# Patient Record
Sex: FEMALE | Race: WHITE | HISPANIC OR LATINO | Employment: FULL TIME | URBAN - NONMETROPOLITAN AREA
[De-identification: names, ages, dates, MRNs, and addresses within clinical notes are randomized per-mention and may not be internally consistent; named-entity substitution may affect disease eponyms.]

---
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
## 2024-03-21 ENCOUNTER — TELEPHONE (OUTPATIENT)
Dept: OBSTETRICS AND GYNECOLOGY | Facility: CLINIC | Age: 24
End: 2024-03-21

## 2024-03-21 NOTE — TELEPHONE ENCOUNTER
Referral received from pcp, called pt to get her schedule, pt states she is receiving care elsewhere.

## 2024-07-16 DIAGNOSIS — O35.EXX0 ENCOUNTER FOR REPEAT ULTRASOUND OF FETAL PYELECTASIS IN SINGLETON PREGNANCY, ANTEPARTUM: Primary | ICD-10-CM

## 2024-07-30 ENCOUNTER — TELEPHONE (OUTPATIENT)
Dept: MATERNAL FETAL MEDICINE | Facility: CLINIC | Age: 24
End: 2024-07-30
Payer: MEDICAID

## 2024-07-30 PROBLEM — O28.3 ABNORMAL PRENATAL ULTRASOUND: Status: ACTIVE | Noted: 2024-07-30

## 2024-07-30 PROBLEM — O98.012: Status: ACTIVE | Noted: 2024-07-30

## 2024-07-31 ENCOUNTER — OFFICE VISIT (OUTPATIENT)
Dept: MATERNAL FETAL MEDICINE | Facility: CLINIC | Age: 24
End: 2024-07-31
Payer: MEDICAID

## 2024-07-31 ENCOUNTER — TELEPHONE (OUTPATIENT)
Dept: MATERNAL FETAL MEDICINE | Facility: CLINIC | Age: 24
End: 2024-07-31

## 2024-07-31 ENCOUNTER — PROCEDURE VISIT (OUTPATIENT)
Dept: MATERNAL FETAL MEDICINE | Facility: CLINIC | Age: 24
End: 2024-07-31
Payer: MEDICAID

## 2024-07-31 VITALS
DIASTOLIC BLOOD PRESSURE: 76 MMHG | HEIGHT: 63 IN | WEIGHT: 201 LBS | SYSTOLIC BLOOD PRESSURE: 124 MMHG | BODY MASS INDEX: 35.61 KG/M2 | HEART RATE: 108 BPM

## 2024-07-31 DIAGNOSIS — O35.EXX0 ENCOUNTER FOR REPEAT ULTRASOUND OF FETAL PYELECTASIS, ANTEPARTUM, SINGLE OR UNSPECIFIED FETUS: ICD-10-CM

## 2024-07-31 DIAGNOSIS — O09.90 AT HIGH RISK FOR COMPLICATIONS OF INTRAUTERINE PREGNANCY (IUP): ICD-10-CM

## 2024-07-31 DIAGNOSIS — O28.3 ABNORMAL PRENATAL ULTRASOUND: Primary | ICD-10-CM

## 2024-07-31 DIAGNOSIS — O99.212 SEVERE OBESITY DUE TO EXCESS CALORIES AFFECTING PREGNANCY IN SECOND TRIMESTER: ICD-10-CM

## 2024-07-31 DIAGNOSIS — O98.012: ICD-10-CM

## 2024-07-31 DIAGNOSIS — O35.EXX0 ENCOUNTER FOR REPEAT ULTRASOUND OF FETAL PYELECTASIS IN SINGLETON PREGNANCY, ANTEPARTUM: ICD-10-CM

## 2024-07-31 DIAGNOSIS — E66.01 SEVERE OBESITY DUE TO EXCESS CALORIES AFFECTING PREGNANCY IN SECOND TRIMESTER: ICD-10-CM

## 2024-07-31 RX ORDER — ASPIRIN 81 MG/1
81 TABLET ORAL DAILY
Qty: 30 TABLET | Refills: 3 | Status: SHIPPED | OUTPATIENT
Start: 2024-07-31 | End: 2024-11-28

## 2024-07-31 NOTE — TELEPHONE ENCOUNTER
----- Message from TAY Cesar sent at 7/30/2024  7:45 PM CDT -----  Regarding: labs  Please call Michael in the am to see if patient had cfDNA or quad screen- need any genetic testing for baby that they have done- Thanks    Called primary they have a NIPS that was done and she is faxing it to us

## 2024-07-31 NOTE — PROGRESS NOTES
Maternal Fetal Medicine New Consult    Subjective:     Patient ID: [id number]    Chief Complaint: MFM Consult w/us  (For fetal pyelectasis)      HPI: 24 y.o.  female  at 25w5d gestation with Estimated Date of Delivery: 24 by LMP, consistent with early US. She is sent for MFM consultation for fetal pyelectasis.     Patient had suspected fetal pyelectasis noted on outside scan.  She had negative cell free DNA.  She also has tuberculosis. Per patient report, appears that it is latent, she reports that she is following with [facility] who has told her that she is not contagious and does not need treatment but they will follow up with her in the postpartum period.  Patient was accompanied by her significant other.  Visit was done with the help of [employer] 989829.       She denies any personal or family history of aneuploidy or anomalies. She denies any exposure to high fevers, viral rashes, illicit drugs or alcohol in this pregnancy.  She denies any leaking fluid, vaginal bleeding, contractions, decreased fetal movement. Denies headaches, visual disturbances, or epigastric pain.       Pregnancy complications include:  Patient Active Problem List   Diagnosis    Malrotation, congenital - possible    Abnormal prenatal ultrasound    Tuberculosis affecting pregnancy in second trimester    At high risk for complications of intrauterine pregnancy (IUP)    Increased BMI at 35 affecting pregnancy in second trimester    Ultrasound recheck of fetal pyelectasis, antepartum       History reviewed. No pertinent past medical history.    History reviewed. No pertinent surgical history.    Family History   Problem Relation Name Age of Onset    Hypertension Mother      Diabetes Mother         Social History     Socioeconomic History    Marital status: Single   Tobacco Use    Smoking status: Never     Passive exposure: Past    Smokeless tobacco: Never   Substance and Sexual Activity    Alcohol use: Not  "Currently    Drug use: Never    Sexual activity: Not Currently     Partners: Male       Current Outpatient Medications   Medication Sig Dispense Refill    PNV no.153/FA/om3/dha/epa/fish (PRENATAL GUMMIES ORAL) Take by mouth.      aspirin (ECOTRIN) 81 MG EC tablet Take 1 tablet (81 mg total) by mouth once daily. 30 tablet 3     No current facility-administered medications for this visit.       Review of patient's allergies indicates:  No Known Allergies     Review of Systems   12 point review of systems conducted, negative except as stated in the history of present illness. See HPI for details.      Objective:     Visit Vitals  /76 (BP Location: Left arm, Patient Position: Sitting, BP Method: Medium (Automatic))   Pulse 108   Ht 5' 3" (1.6 m)   Wt 91.2 kg (201 lb)   LMP  (LMP Unknown)   BMI 35.61 kg/m²        Physical Exam  Vitals and nursing note reviewed.   Constitutional:       General: She is not in acute distress.     Appearance: Normal appearance.      Comments: Increased BMI   HENT:      Head: Normocephalic and atraumatic.      Nose: Nose normal. No congestion.      Mouth/Throat:      Pharynx: Oropharynx is clear.   Eyes:      General: No scleral icterus.     Conjunctiva/sclera: Conjunctivae normal.   Cardiovascular:      Rate and Rhythm: Normal rate and regular rhythm.   Pulmonary:      Effort: No respiratory distress.      Breath sounds: Normal breath sounds. No wheezing.   Abdominal:      General: Abdomen is flat.      Palpations: Abdomen is soft.      Tenderness: There is no abdominal tenderness. There is no right CVA tenderness, left CVA tenderness or guarding.      Comments: No CVA tenderness gravid uterus.    Musculoskeletal:         General: Normal range of motion.      Cervical back: Neck supple.      Right lower leg: No edema.      Left lower leg: No edema.   Skin:     General: Skin is warm.      Findings: No bruising or rash.   Neurological:      General: No focal deficit present.      Mental " Status: She is oriented to person, place, and time.      Deep Tendon Reflexes: Reflexes normal.      Comments: Normal reflexes   Psychiatric:         Mood and Affect: Mood normal.         Behavior: Behavior normal.         Thought Content: Thought content normal.         Judgment: Judgment normal.         Assessment/Plan:     24 y.o.  female with IUP at 25w5d    Unilateral fetal Pyelectasis  There is normal fetal growth with an EFW of 912 g at the 56% and the AC at the 57% on 24  AFV is normal.     AP diameter of R renal pelvis measures 5.7mm on 24.     She was advised that with mild fetal pyelectasis there is increased risk of aneuploidy, two fold above what is expected based on maternal age and or quad screen risk.    Pyelectasis may be transient or physiologic in 50-70% or due to ureteropelvic junction obstruction in 10-30%, vesicoureteral reflux in 10-40%, or lower urinary tract obstruction (LUTO) in 10-15%. It has been described as a weak marker for aneuploidy, particularly with a likelihood ratio of 1.5-2.0 above what is expected based on maternal age and/or quad screen risk. The risks and benefits of genetic amniocentesis were explained to her. She declined amniocentesis . She was advised of the need for  evaluation.    I discussed and counseled on Cell free DNA understanding that it is an enhanced screening test but not a diagnostic test. It assesses risk for common aneuploidies such as Trisomy 13, 18, and 21 by evaluating cell-free fetal DNA in maternal circulation with a false positive rate less than 0.5% for Trisomy 21 and less reliable for Trisomy 13 and Trisomy 18. She had cell free DNA that was negative .    Because mild pyelectasis may be a precursor of more significant hydronephrosis, we will plan to recheck fetal growth around 32 weeks, unless needed earlier ultrasounds for other reasons. If progressive hydronephrosis is noted, then continued follow-up antenatally and with   follow-up as needed.     If persistent pyelectasis, recommend   ultrasound 1 week after birth with pediatrician/pediatric urologist. If persistent pyelectasis, baby will need referral to pediatric urologist.    I discussed with her that antenatally as long as there is normal amniotic fluid volume then expectant management will be continued through the pregnancy. Fetal kick count instructions were given. She was advised to report any decreased fetal movement.      Tuberculosis  Per Patient report, it appears that patient has latent TB.     Risk factors for TB include - HIV positive status, living or working in high risk setting, living or emigrating from high risk country, exposure, low socioeconomic status and IV drug use.     Latent TB: This is diagnosed via +mantoux/blood test, negative CXR and sputum. 5-10% of patient's with latent tuberculosis (TB) will progress to active disease. Latent TB is NOT contagious, with the only risk is that it can progress to active. Latent TB can wait to be treated until 2-3 months postpartum if risk is low of progression to active disease. Some risk factors for progression include: HIV/immunocompromised, IV drug use, infection in the last 2 years. For women who are at high risk for progression from latent TB infection to TB disease, especially those who are a recent contact of someone with infectious TB disease, treatment for latent TB infection should not be delayed on the basis of pregnancy alone, even during the first trimester.    Active TB: This is diagnosed via +mantoux/blood, positive CXR and/or sputum cultures. Mantoux may remain positive 2-12 weeks after exposure. Blood test is preferred if someone has had BCG vaccine. Active TB in pregnancy increases risk of pregnancy loss, stillbirth,  delivery, and lower birth weight. There is also risk of congenital TB, which can cause sepsis, hepatic infiltation, or pneumonia. If this is suspected, we  will plan to culture the placenta. Active TB should be treated for a minimum of 9 months with INH, rifampin, ethambutol (see below). Pyrazinamide should be added if extrapulmonary, severe active, or HIV coinfection.     If +BCG vaccine >10 years ago and +PPD >10mm, patient can be considered infected with TB    Breastfeeding should not be discouraged for women being treated with the first-line antituberculosis drugs because the concentrations of these drugs in breast milk are too small to produce toxicity in the nursing . For the same reason, drugs in breast milk are not an effective treatment for TB disease or latent TB infection in a nursing infant.  Breastfeeding women taking INH should also take pyridoxine (vitamin B6) supplementation.  RIF can cause orange discoloration of body fluids, including breast milk. Orange discoloration of body fluids is expected and harmless. There currently is not enough data to indicate whether the 3HP regimen is safe for women to take while breastfeeding.  However, untreated active TB is a contraindication to breastfeeding      Recommendations:  Keep followup with [facility] Department as directed.  Growth ultrasound is recommended in the third trimester  Otherwise, routine prenatal care.  No specific recommendations for prenatal testing with no evidence of active disease.  Mode and timing of delivery per routine OB indications.  Breastfeeding recommended per patient desire, unless active and untreated. (2 weeks of treatment and a negative sputum culture may allow to breastfeed)  Primary OB to alert pediatrics regarding patient's diagnosis.      Preeclampsia prophylaxis  With her risk factors for preeclampsia including  nulliparity, BMI over 30, and low socioeconomic status, discussed recommendations for low dose aspirin use to decrease risks for adverse outcomes, including preeclampsia, low birth weight and  delivery. Low-dose aspirin reduced the risk for preeclampsia by  15% in clinical trials and reduced the risk for  birth by 20% and FGR by 20%, and  mortality by around 20%.  After discussing risks/benefits of its use, it was agreed to start asa 81 mg daily today and continue until delivery.. Also, recommend using in all future pregnancies.  Preeclampsia precautions given.    Patient was counseled on fetal pyelectasis association with aneuploidy.  With a negative cell free DNA advised her that no additional testing is recommended.  However she was offered and politely declined to have an amniocentesis.  Diet advice with proper weight gain recommendations reviewed.  Patient was started on daily aspirin to lower the risk of preeclampsia.  Advised her to follow with health unit as she might need treatment after delivery for her latent tuberculosis. Patient's questions were answered.  She is in agreement with plan of care.      Follow up in about 6 weeks (around 2024) for [facility] Followup, Repeat Ultrasound.     Future Appointments   Date Time Provider Department Center   2024  3:00 PM Brenotn Gonzalez MD [facility]   2024  [facility]        Patient was evaluated by TAY Cesar, and and Dr. Gonzalez.  Final assessment and recommendations as stated above were made by Dr. Gonzalez.    This note was created with the assistance of [employer] voice recognition software. There may be transcription errors as a result of using this technology, however minimal. Effort has been made to ensure accuracy of transcription, but any obvious errors or omissions should be clarified with the author of the document.

## 2024-09-05 DIAGNOSIS — O35.EXX0 ENCOUNTER FOR REPEAT ULTRASOUND OF FETAL PYELECTASIS IN SINGLETON PREGNANCY, ANTEPARTUM: Primary | ICD-10-CM

## 2024-09-05 DIAGNOSIS — O98.012: ICD-10-CM

## 2024-09-20 PROBLEM — Q43.3 MALROTATION, CONGENITAL: Status: RESOLVED | Noted: 2017-03-29 | Resolved: 2024-09-20

## 2024-09-20 PROBLEM — O98.013: Status: ACTIVE | Noted: 2024-07-30

## 2024-09-20 PROBLEM — O99.213 SEVERE OBESITY DUE TO EXCESS CALORIES AFFECTING PREGNANCY IN THIRD TRIMESTER: Status: ACTIVE | Noted: 2024-07-31

## 2024-09-20 NOTE — PROGRESS NOTES
Maternal Fetal Medicine Follow Up    Subjective:     Patient ID: [id number]    Chief Complaint: MFM follow up w/us  (Pt did not bring bs log for review however reports fasting's up to 95, 1 hr pp up to 120)      HPI: Lidia Baez is a 24 y.o. female  at 33w3d gestation with Estimated Date of Delivery: 24  who is here for follow-up consultation by MFM.    Patient had fetal pyelectasis noted on previous US.  She had negative cell free DNA and declined amniocentesis.  She also has tuberculosis. Per patient report, appears that it is latent, she reports that she is following with the [facility] who has told her that she is not contagious and does not need treatment but they will follow up with her in the postpartum period.  She had increased BMI over 35 at initial MFM consult.  She is on low-dose aspirin daily.  Patient reports was recently diagnosed with GDM and is following diabetic diet and monitoring her sugars.  She did not bring a log for review today but reports fasting values up to 95 (mostly below 90) and 2 hour postprandial up to 120.  She reports that her postprandial values are mostly in the normal range but has a occasional elevation of 120 a couple times a week.    Today's visit was done with the help of [employer] translation services,  ID number [id number].        Interval history since last MFM visit: None.. She denies any leaking fluid, vaginal bleeding, contractions, decreased fetal movement. Denies headaches, visual disturbances, or epigastric pain.    Pregnancy complications include:   Patient Active Problem List   Diagnosis    Abnormal prenatal ultrasound    Tuberculosis affecting pregnancy in third trimester    At high risk for complications of intrauterine pregnancy (IUP)    Increased BMI over 35 affecting pregnancy in third trimester    Ultrasound recheck of fetal pyelectasis, antepartum    Gestational diabetes mellitus (GDM) in third trimester       No changes  "to medical, surgical, family, social, or obstetric history.    Medications:  Current Outpatient Medications   Medication Instructions    ALCOHOL PREP PAD SPACER SMARTSI Swab Topical 5 Times Daily PRN    aspirin (ECOTRIN) 81 mg, Oral, Daily    PNV no.153/FA/om3/dha/epa/fish (PRENATAL GUMMIES ORAL) Oral    TRUE METRIX AIR GLUCOSE METER Misc USE TO CHECK GLUCOSE FIVE TIMES DAILY AND AS NEEDED    TRUE METRIX GLUCOSE TEST STRIP Strp SMARTSI Strip(s) Via Meter 5 Times Daily PRN       Review of Systems   12 point review of systems conducted, negative except as stated in the history of present illness. See HPI for details.      Objective:     Visit Vitals  /85 (BP Location: Left arm, Patient Position: Sitting, BP Method: Medium (Automatic))   Pulse (!) 119   Ht 5' 3" (1.6 m)   Wt 93.4 kg (206 lb)   LMP  (LMP Unknown)   BMI 36.49 kg/m²        Physical Exam  Vitals and nursing note reviewed.   Constitutional:       Appearance: Normal appearance.      Comments: Increased BMI   HENT:      Nose: Nose normal.   Cardiovascular:      Rate and Rhythm: Normal rate.   Pulmonary:      Effort: Pulmonary effort is normal. No respiratory distress.   Musculoskeletal:         General: Normal range of motion.   Neurological:      General: No focal deficit present.      Mental Status: She is alert and oriented to person, place, and time.   Psychiatric:         Mood and Affect: Mood normal.         Behavior: Behavior normal.         Thought Content: Thought content normal.         Judgment: Judgment normal.         Assessment/Plan:     24 y.o.  female with IUP at 33w3d    Right fetal pyelectasis  There is normal fetal growth with an EFW of 2250 g at the 30% and the AC at the 39% on 24.  AFV is normal. BPP is 8/8 today (2024).     AP diameter of R renal pelvis measures 7.6 mm on 24.     She had cell free DNA that was negative . She declined amniocentesis . She is aware of the need for  " evaluation.    Recommend   ultrasound 1 week after birth with pediatrician/pediatric urologist. If persistent pyelectasis, baby will need referral to pediatric urologist.    I reviewed with her that antenatally as long as there is normal amniotic fluid volume then expectant management will be continued through the pregnancy. Fetal kick count instructions were reviewed. She was advised to report any decreased fetal movement.       History of tuberculosis  Per Patient and Health Department, it appears that patient has latent TB.     Latent TB: This is diagnosed via +mantoux/blood test, negative CXR and sputum. 5-10% of patient's with latent tuberculosis (TB) will progress to active disease. Latent TB is NOT contagious, with the only risk is that it can progress to active. Latent TB can wait to be treated until 2-3 months postpartum if risk is low of progression to active disease. Some risk factors for progression include: HIV/immunocompromised, IV drug use, infection in the last 2 years. For women who are at high risk for progression from latent TB infection to TB disease, especially those who are a recent contact of someone with infectious TB disease, treatment for latent TB infection should not be delayed on the basis of pregnancy alone, even during the first trimester.    Breastfeeding should not be discouraged for women being treated with the first-line antituberculosis drugs because the concentrations of these drugs in breast milk are too small to produce toxicity in the nursing . For the same reason, drugs in breast milk are not an effective treatment for TB disease or latent TB infection in a nursing infant.    Breastfeeding women taking INH should also take pyridoxine (vitamin B6) supplementation.  RIF can cause orange discoloration of body fluids, including breast milk. Orange discoloration of body fluids is expected and harmless. There currently is not enough data to indicate whether  the 3HP regimen is safe for women to take while breastfeeding. However, untreated active TB is a contraindication to breastfeeding    Recommendations:  Keep followup with Health Department as directed.  With normal growth, recommend routine prenatal care.  No specific recommendations for prenatal testing with no evidence of active disease.  Mode and timing of delivery per routine OB indications.  Breastfeeding recommended per patient desire, unless active and untreated. (2 weeks of treatment and a negative sputum culture may allow to breastfeed)  Primary OB to alert pediatrics regarding patient's diagnosis.      Gestational diabetes  The incidence of diabetes in pregnancy is 6-7%, and about 85% represent GDM. I discussed with her risks associated with diabetes in pregnancy including higher risk for polyhydramnios, fetal macrosomia, lower Apgar scores and  metabolic complications (hypoglycemia, hyperbilirubinemia, hypocalcemia, erythema) and developing preeclampsia    Currently, she was advised that she needs to be on 2200 calorie diabetic diet.It is recommended that she have 30-45 grams of carbohydrates with breakfast, a mid-morning snack with 15-30 grams of carbohydrates, 45-60 grams of carbohydrates with lunch, a mid-afternoon snack with 15-30 grams of carbohydrates, 45-60 grams of carbohydrates with dinner, and a bedtime snack with 15-30 grams of carbohydrates. The importance of avoiding any significant weight gain till the end of the pregnancy was discussed.  She will be monitoring her sugars at home.  If the 1 hour postprandial greater than 140 and pre-prandial blood sugar greater than 90, then she will let me know.      I have shared with her the options of treatment including insulin treatment which is the gold standard of care for diabetes in pregnancy versus a recent use of alternatives, such as glyburide or metformin (both crosses the placenta). Although, glyburide has been used over the past 10  years as primary alternative to insulin, a recent meta-analysis (2015) suggested that metformin should be the alternative to insulin and not glyburide. The conclusion of the study showed a higher birth weight (100 g) associated with glyburide compared to insulin, two fold higher risk of  hypoglycemia, and more than 2x higher risk of macrosomia associated with glyburide use. This difference is likely to be secondary to hyperinsulinism in fetus secondary to fetal exposure to glyburide.  Metformin, also had lower maternal weight gain, lower birth rate and less risk of macrosomia when compared with glyburide. When compared to insulin, Metformin had lower maternal weight gain, increase in  birth and lower gestational age at delivery and lower incidence of gestational hypertension. There was a trend for less  hypoglycemia and high failure rate of 30-35%, when compared to insulin.  In addition, the lack of long term data on glyburide and metformin use regarding safety was addressed and recommended use of Insulin for treatment, which is the gold standard, with excellent efficiency and safety, albeit more inconvenience.  Questions were answered.     Reported values discussed. With mostly normal values, no treatment is needed at this time.  With patient reporting a few elevations but no consistent pattern, we will plan to see her back in 2 weeks to review the blood sugars and determine need for treatment.      She was advised to check glucose 4 times a day and send log/call with values weekly, and bring log to each visit.    If she does not need insulin, then regular pregnancy management could be done with consideration for fetal testing after 38 weeks gestation with awaiting normal spontaneous onset of labor and management of the pregnancy. If Insulin is needed, then closer fetal surveillance will be needed. She was advised to continue fetal kick counts 3 times daily and PRN, with immediate reporting  of decreased fetal movements (<10 movements/hr).    The association of gestational diabetes (50%) with adult-onset type 2 diabetes mellitus was reviewed.  She was advised of importance of losing weight after the pregnancy, as well as doing a 75g 2hr glucose tolerance test after postpartum exam, and checkups every 1-3 years for blood sugars to make sure she does not develop diabetes.        Increased BMI in pregnancy  Body mass index is 36.49 kg/m². With reasonable weight gain since last visit, she was advised to continue a healthy low caloric diet and diabetic diet. Excess weight gain would be associated with gestational hypertension, gestational diabetes and adverse  outcomes, including fetal demise in utero.    It is important to lose weight after the pregnancy is over, especially before a future pregnancy was discussed. Breastfeeding may be an important tool in reducing the postpartum weight retention. Fetal risks were discussed with short term risk of fetal/ obesity and long term risk of adolescent component of metabolic syndrome.    With elevated BMI, start fetal testing starting around 37 weeks. She was advised to continue fetal kick counts 3 times daily and PRN, with immediate reporting of decreased fetal movements (<10 movements/hr).       Preeclampsia prophylaxis  With her increased risk for preeclampsia, she agreed to continue asa 81 mg daily until delivery. Preeclampsia precautions reviewed.     Follow up in about 2 weeks (around 10/7/2024) for MFM follow-up; 4 weeks MFM f/u repeat bpp.     Future Appointments   Date Time Provider Department Center   10/7/2024  2:45 PM Brenton Gonzalez MD [facility]   10/21/2024  3:00 PM Brenton Gonzalez MD [facility]   10/21/2024  3:00 PM ROOM 1, [facility]      Amanda Graves PA-C     This note was created with the assistance of [employer] voice recognition software. There may be transcription errors as a result of  using this technology, however minimal. Effort has been made to ensure accuracy of transcription, but any obvious errors or omissions should be clarified with the author of the document.

## 2024-09-23 ENCOUNTER — OFFICE VISIT (OUTPATIENT)
Dept: MATERNAL FETAL MEDICINE | Facility: CLINIC | Age: 24
End: 2024-09-23
Payer: MEDICAID

## 2024-09-23 ENCOUNTER — PROCEDURE VISIT (OUTPATIENT)
Dept: MATERNAL FETAL MEDICINE | Facility: CLINIC | Age: 24
End: 2024-09-23
Payer: MEDICAID

## 2024-09-23 VITALS
DIASTOLIC BLOOD PRESSURE: 85 MMHG | BODY MASS INDEX: 36.5 KG/M2 | HEIGHT: 63 IN | HEART RATE: 119 BPM | SYSTOLIC BLOOD PRESSURE: 121 MMHG | WEIGHT: 206 LBS

## 2024-09-23 DIAGNOSIS — O99.213 SEVERE OBESITY DUE TO EXCESS CALORIES AFFECTING PREGNANCY IN THIRD TRIMESTER: ICD-10-CM

## 2024-09-23 DIAGNOSIS — E66.01 SEVERE OBESITY DUE TO EXCESS CALORIES AFFECTING PREGNANCY IN THIRD TRIMESTER: ICD-10-CM

## 2024-09-23 DIAGNOSIS — O35.EXX0 ENCOUNTER FOR REPEAT ULTRASOUND OF FETAL PYELECTASIS, ANTEPARTUM, SINGLE OR UNSPECIFIED FETUS: ICD-10-CM

## 2024-09-23 DIAGNOSIS — O98.012: ICD-10-CM

## 2024-09-23 DIAGNOSIS — O35.EXX0 ENCOUNTER FOR REPEAT ULTRASOUND OF FETAL PYELECTASIS IN SINGLETON PREGNANCY, ANTEPARTUM: ICD-10-CM

## 2024-09-23 DIAGNOSIS — O24.419 GESTATIONAL DIABETES MELLITUS (GDM) IN THIRD TRIMESTER, GESTATIONAL DIABETES METHOD OF CONTROL UNSPECIFIED: ICD-10-CM

## 2024-09-23 DIAGNOSIS — O28.3 ABNORMAL PRENATAL ULTRASOUND: ICD-10-CM

## 2024-09-23 DIAGNOSIS — O98.013 TUBERCULOSIS AFFECTING PREGNANCY IN THIRD TRIMESTER: Primary | ICD-10-CM

## 2024-09-23 LAB — GLUCOSE SERPL-MCNC: 124 MG/DL (ref 70–110)

## 2024-09-23 PROCEDURE — 3079F DIAST BP 80-89 MM HG: CPT | Mod: CPTII,S$GLB,,

## 2024-09-23 PROCEDURE — 76816 OB US FOLLOW-UP PER FETUS: CPT | Mod: S$GLB,,, | Performed by: OBSTETRICS & GYNECOLOGY

## 2024-09-23 PROCEDURE — 3008F BODY MASS INDEX DOCD: CPT | Mod: CPTII,S$GLB,,

## 2024-09-23 PROCEDURE — 3074F SYST BP LT 130 MM HG: CPT | Mod: CPTII,S$GLB,,

## 2024-09-23 PROCEDURE — 1159F MED LIST DOCD IN RCRD: CPT | Mod: CPTII,S$GLB,,

## 2024-09-23 PROCEDURE — 99214 OFFICE O/P EST MOD 30 MIN: CPT | Mod: TH,S$GLB,,

## 2024-09-23 PROCEDURE — 82962 GLUCOSE BLOOD TEST: CPT | Mod: ,,,

## 2024-09-23 PROCEDURE — 1160F RVW MEDS BY RX/DR IN RCRD: CPT | Mod: CPTII,S$GLB,,

## 2024-09-23 RX ORDER — CALCIUM CITRATE/VITAMIN D3 200MG-6.25
TABLET ORAL
COMMUNITY
Start: 2024-08-21

## 2024-09-23 RX ORDER — ISOPROPYL ALCOHOL 0.75 G/1
SWAB TOPICAL
COMMUNITY
Start: 2024-08-21

## 2024-09-23 RX ORDER — DIPHENHYDRAMINE HCL 25 MG
TABLET ORAL
COMMUNITY
Start: 2024-08-21

## 2024-10-07 ENCOUNTER — OFFICE VISIT (OUTPATIENT)
Dept: MATERNAL FETAL MEDICINE | Facility: CLINIC | Age: 24
End: 2024-10-07
Payer: MEDICAID

## 2024-10-07 VITALS
HEART RATE: 123 BPM | BODY MASS INDEX: 36.86 KG/M2 | HEIGHT: 63 IN | DIASTOLIC BLOOD PRESSURE: 87 MMHG | WEIGHT: 208 LBS | SYSTOLIC BLOOD PRESSURE: 135 MMHG

## 2024-10-07 DIAGNOSIS — O35.EXX0 ENCOUNTER FOR REPEAT ULTRASOUND OF FETAL PYELECTASIS, ANTEPARTUM, SINGLE OR UNSPECIFIED FETUS: ICD-10-CM

## 2024-10-07 DIAGNOSIS — E66.01 SEVERE OBESITY DUE TO EXCESS CALORIES AFFECTING PREGNANCY IN THIRD TRIMESTER: ICD-10-CM

## 2024-10-07 DIAGNOSIS — O09.90 AT HIGH RISK FOR COMPLICATIONS OF INTRAUTERINE PREGNANCY (IUP): Primary | ICD-10-CM

## 2024-10-07 DIAGNOSIS — O99.213 SEVERE OBESITY DUE TO EXCESS CALORIES AFFECTING PREGNANCY IN THIRD TRIMESTER: ICD-10-CM

## 2024-10-07 DIAGNOSIS — O24.415 GESTATIONAL DIABETES MELLITUS (GDM) IN THIRD TRIMESTER CONTROLLED ON ORAL HYPOGLYCEMIC DRUG: ICD-10-CM

## 2024-10-07 DIAGNOSIS — O24.410 DIET CONTROLLED GESTATIONAL DIABETES MELLITUS (GDM) IN THIRD TRIMESTER: ICD-10-CM

## 2024-10-07 DIAGNOSIS — O98.013 TUBERCULOSIS AFFECTING PREGNANCY IN THIRD TRIMESTER: ICD-10-CM

## 2024-10-07 LAB — GLUCOSE SERPL-MCNC: 110 MG/DL (ref 70–110)

## 2024-10-07 PROCEDURE — 3079F DIAST BP 80-89 MM HG: CPT | Mod: CPTII,S$GLB,, | Performed by: OBSTETRICS & GYNECOLOGY

## 2024-10-07 PROCEDURE — 99214 OFFICE O/P EST MOD 30 MIN: CPT | Mod: TH,S$GLB,, | Performed by: OBSTETRICS & GYNECOLOGY

## 2024-10-07 PROCEDURE — 3008F BODY MASS INDEX DOCD: CPT | Mod: CPTII,S$GLB,, | Performed by: OBSTETRICS & GYNECOLOGY

## 2024-10-07 PROCEDURE — 3075F SYST BP GE 130 - 139MM HG: CPT | Mod: CPTII,S$GLB,, | Performed by: OBSTETRICS & GYNECOLOGY

## 2024-10-07 PROCEDURE — 1160F RVW MEDS BY RX/DR IN RCRD: CPT | Mod: CPTII,S$GLB,, | Performed by: OBSTETRICS & GYNECOLOGY

## 2024-10-07 PROCEDURE — 82962 GLUCOSE BLOOD TEST: CPT | Mod: ,,, | Performed by: OBSTETRICS & GYNECOLOGY

## 2024-10-07 PROCEDURE — 1159F MED LIST DOCD IN RCRD: CPT | Mod: CPTII,S$GLB,, | Performed by: OBSTETRICS & GYNECOLOGY

## 2024-10-07 RX ORDER — METFORMIN HYDROCHLORIDE 500 MG/1
500 TABLET ORAL 2 TIMES DAILY WITH MEALS
Qty: 60 TABLET | Refills: 0 | Status: SHIPPED | OUTPATIENT
Start: 2024-10-07 | End: 2024-11-06

## 2024-10-15 DIAGNOSIS — E66.01 SEVERE OBESITY DUE TO EXCESS CALORIES AFFECTING PREGNANCY IN THIRD TRIMESTER: ICD-10-CM

## 2024-10-15 DIAGNOSIS — O24.410 DIET CONTROLLED GESTATIONAL DIABETES MELLITUS (GDM) IN THIRD TRIMESTER: ICD-10-CM

## 2024-10-15 DIAGNOSIS — O99.213 SEVERE OBESITY DUE TO EXCESS CALORIES AFFECTING PREGNANCY IN THIRD TRIMESTER: ICD-10-CM

## 2024-10-15 DIAGNOSIS — O98.013 TUBERCULOSIS AFFECTING PREGNANCY IN THIRD TRIMESTER: Primary | ICD-10-CM

## 2024-10-18 PROBLEM — O28.3 ABNORMAL PRENATAL ULTRASOUND: Status: RESOLVED | Noted: 2024-07-30 | Resolved: 2024-10-18

## 2024-10-18 NOTE — PROGRESS NOTES
Maternal Fetal Medicine Follow Up    Subjective:     Patient ID: [id number]    Chief Complaint: MFM follow up with US (GDM.)      HPI: Lidia Baez is a 24 y.o. female  at 37w3d gestation with Estimated Date of Delivery: 24  who is here for follow-up consultation by MFM.    There was right unilateral pyelectasis seen on previous ultrasound.  She had negative cell free DNA and declined amniocentesis.  She also has tuberculosis. Per patient report, appears that it is latent, she reports that she is following with the [facility] who has told her that she is not contagious and does not need treatment but they will follow up with her in the postpartum period.  She had increased BMI over 35 at initial MFM consult.  She is on low-dose aspirin daily.  She has gestational diabetes and she is on metformin, 500 mg BID.    Today's visit was done with the help of [employer] translation services,  ID number [id number], and [id number] with 2nd  used after interruption of the visit because of loss of Internet.         Interval history since last MFM visit: None.. She denies any leaking fluid, vaginal bleeding, contractions, decreased fetal movement. Denies headaches, visual disturbances, or epigastric pain.    Pregnancy complications include:   Patient Active Problem List   Diagnosis    Tuberculosis affecting pregnancy in third trimester    At high risk for complications of intrauterine pregnancy (IUP)    Increased BMI over 35 affecting pregnancy in third trimester    Ultrasound recheck of fetal pyelectasis, antepartum    Gestational diabetes mellitus (GDM) in third trimester controlled on oral hypoglycemic drug       No changes to medical, surgical, family, social, or obstetric history.    Medications:  Current Outpatient Medications   Medication Instructions    ALCOHOL PREP PAD SPACER SMARTSI Swab Topical 5 Times Daily PRN    aspirin (ECOTRIN) 81 mg, Oral, Daily    metFORMIN  "(GLUCOPHAGE) 500 mg, Oral, 2 times daily with meals    PNV no.153/FA/om3/dha/epa/fish (PRENATAL GUMMIES ORAL) Take by mouth.    TRUE METRIX AIR GLUCOSE METER Misc USE TO CHECK GLUCOSE FIVE TIMES DAILY AND AS NEEDED    TRUE METRIX GLUCOSE TEST STRIP Strp SMARTSI Strip(s) Via Meter 5 Times Daily PRN       Review of Systems   12 point review of systems conducted, negative except as stated in the history of present illness. See HPI for details.      Objective:     Visit Vitals  /89 (BP Location: Left arm, Patient Position: Sitting)   Pulse 95   Ht 5' 3" (1.6 m)   Wt 96.2 kg (212 lb)   LMP  (LMP Unknown)   BMI 37.55 kg/m²        Physical Exam  Vitals and nursing note reviewed.   Constitutional:       Appearance: Normal appearance.      Comments: Increased BMI   HENT:      Nose: Nose normal.   Cardiovascular:      Rate and Rhythm: Normal rate.   Pulmonary:      Effort: Pulmonary effort is normal. No respiratory distress.   Musculoskeletal:         General: Normal range of motion.   Neurological:      General: No focal deficit present.      Mental Status: She is alert and oriented to person, place, and time.   Psychiatric:         Mood and Affect: Mood normal.         Behavior: Behavior normal.         Thought Content: Thought content normal.         Judgment: Judgment normal.         Assessment/Plan:     24 y.o.  female with IUP at 37w3d    Right fetal pyelectasis, resolved  There is normal fetal growth with an EFW of 3470 g at the 72% and the AC at the 75% on 10/21/24  AFV is normal. Biophysical profile  on today, 10/21/2024     AP diameter bilateral renal pelvises measure normal at this gestational age today.    She had cell free DNA that was negative . She declined amniocentesis . She is aware of the need for  evaluation.      History of tuberculosis  Per Patient and Health Department, it appears that patient has latent TB.     Latent TB: This is diagnosed via +mantoux/blood test, negative " CXR and sputum. 5-10% of patient's with latent tuberculosis (TB) will progress to active disease. Latent TB is NOT contagious, with the only risk is that it can progress to active. Latent TB can wait to be treated until 2-3 months postpartum if risk is low of progression to active disease. Some risk factors for progression include: HIV/immunocompromised, IV drug use, infection in the last 2 years. For women who are at high risk for progression from latent TB infection to TB disease, especially those who are a recent contact of someone with infectious TB disease, treatment for latent TB infection should not be delayed on the basis of pregnancy alone, even during the first trimester.    Breastfeeding should not be discouraged for women being treated with the first-line antituberculosis drugs because the concentrations of these drugs in breast milk are too small to produce toxicity in the nursing . For the same reason, drugs in breast milk are not an effective treatment for TB disease or latent TB infection in a nursing infant.    Breastfeeding women taking INH should also take pyridoxine (vitamin B6) supplementation.  RIF can cause orange discoloration of body fluids, including breast milk. Orange discoloration of body fluids is expected and harmless. There currently is not enough data to indicate whether the 3HP regimen is safe for women to take while breastfeeding. However, untreated active TB is a contraindication to breastfeeding    Recommendations:  Keep followup with [facility] Department as directed.  With normal growth, recommend routine prenatal care.  No specific recommendations for prenatal testing with no evidence of active disease.  Mode and timing of delivery per routine OB indications.  Breastfeeding recommended per patient desire, unless active and untreated. (2 weeks of treatment and a negative sputum culture may allow to breastfeed)  Primary OB to alert pediatrics regarding patient's  diagnosis.      Gestational diabetes   Continue 2200 calorie ADA diet.    Log reviewed.  After supper but 2 hour postprandials are frequently in the 120s and some fastings in the 90s, up to low 100s  She was advised to adjusted dose of metformin to 500 mg in the morning and 1000 mg with supper.  Patient will continue weekly fetal testing with the primary OB..    Recommend continue weekly fetal testing with primary OB until delivery.  Reviewed fetal kick count instructions.    The association of gestational diabetes (50%) with adult-onset type 2 diabetes mellitus was reviewed.  She is aware of importance of losing weight after the pregnancy, as well as doing a 75g 2hr glucose tolerance test after postpartum exam, and checkups every 1-3 years for blood sugars to make sure she does not develop diabetes.      With gestational diabetes mellitus on metformin, and BMI over 35,  recommend delivery at 39 weeks' gestation (39-39 6/7th weeks) as optimal balance between fetal complications and  complications. Earlier delivery may be needed for worsening maternal or fetal status.      Increased BMI in pregnancy  Body mass index is 37.55 kg/m². With  4 lb gain since last visit, she was advised to continue a healthy low caloric diet and diabetic diet.  Excess weight gain would be associated with gestational hypertension, worsening gestational diabetes and adverse  outcomes, including fetal demise in utero.    Reviewed importance of FKC 3/day and prn with instructions to immediately report any decreased fetal movement.    It is important to lose weight after the pregnancy is over, especially before a future pregnancy. Breastfeeding may be an important tool in reducing the postpartum weight retention. Fetal risks were discussed with short term risk of fetal/ obesity and long term risk of adolescent component of metabolic syndrome.      Preeclampsia prophylaxis  With her increased risk for preeclampsia, she  agreed to continue asa 81 mg daily until delivery. Preeclampsia precautions reviewed.     I adjusted the dose of metformin as above.  New prescription was sent.  Reminded the patient of the importance of fetal kick counts and importance of continuing diet and exercise and having weight loss to decrease the risk of type 2 diabetes.    Follow up for None. Keep follow up with primary OB.     No future appointments.     Patient was evaluated and examined by Dr. Gonzalez. TAY Cesar, helped in pre charting of part of note.    This note was created with the assistance of [employer] voice recognition software. There may be transcription errors as a result of using this technology, however minimal. Effort has been made to ensure accuracy of transcription, but any obvious errors or omissions should be clarified with the author of the document.

## 2024-10-21 ENCOUNTER — PROCEDURE VISIT (OUTPATIENT)
Dept: MATERNAL FETAL MEDICINE | Facility: CLINIC | Age: 24
End: 2024-10-21
Payer: MEDICAID

## 2024-10-21 ENCOUNTER — OFFICE VISIT (OUTPATIENT)
Dept: MATERNAL FETAL MEDICINE | Facility: CLINIC | Age: 24
End: 2024-10-21
Payer: MEDICAID

## 2024-10-21 VITALS
HEART RATE: 95 BPM | WEIGHT: 212 LBS | HEIGHT: 63 IN | BODY MASS INDEX: 37.56 KG/M2 | SYSTOLIC BLOOD PRESSURE: 122 MMHG | DIASTOLIC BLOOD PRESSURE: 89 MMHG

## 2024-10-21 DIAGNOSIS — O35.EXX0 ENCOUNTER FOR REPEAT ULTRASOUND OF FETAL PYELECTASIS, ANTEPARTUM, SINGLE OR UNSPECIFIED FETUS: ICD-10-CM

## 2024-10-21 DIAGNOSIS — O24.410 DIET CONTROLLED GESTATIONAL DIABETES MELLITUS (GDM) IN THIRD TRIMESTER: ICD-10-CM

## 2024-10-21 DIAGNOSIS — O24.415 GESTATIONAL DIABETES MELLITUS (GDM) IN THIRD TRIMESTER CONTROLLED ON ORAL HYPOGLYCEMIC DRUG: ICD-10-CM

## 2024-10-21 DIAGNOSIS — E66.01 SEVERE OBESITY DUE TO EXCESS CALORIES AFFECTING PREGNANCY IN THIRD TRIMESTER: ICD-10-CM

## 2024-10-21 DIAGNOSIS — O98.013 TUBERCULOSIS AFFECTING PREGNANCY IN THIRD TRIMESTER: ICD-10-CM

## 2024-10-21 DIAGNOSIS — O98.013 TUBERCULOSIS AFFECTING PREGNANCY IN THIRD TRIMESTER: Primary | ICD-10-CM

## 2024-10-21 DIAGNOSIS — O99.213 SEVERE OBESITY DUE TO EXCESS CALORIES AFFECTING PREGNANCY IN THIRD TRIMESTER: ICD-10-CM

## 2024-10-21 LAB — GLUCOSE SERPL-MCNC: 80 MG/DL (ref 70–110)

## 2024-10-21 PROCEDURE — 3074F SYST BP LT 130 MM HG: CPT | Mod: CPTII,S$GLB,, | Performed by: OBSTETRICS & GYNECOLOGY

## 2024-10-21 PROCEDURE — 99214 OFFICE O/P EST MOD 30 MIN: CPT | Mod: TH,S$GLB,, | Performed by: OBSTETRICS & GYNECOLOGY

## 2024-10-21 PROCEDURE — 3079F DIAST BP 80-89 MM HG: CPT | Mod: CPTII,S$GLB,, | Performed by: OBSTETRICS & GYNECOLOGY

## 2024-10-21 PROCEDURE — 3008F BODY MASS INDEX DOCD: CPT | Mod: CPTII,S$GLB,, | Performed by: OBSTETRICS & GYNECOLOGY

## 2024-10-21 PROCEDURE — 76819 FETAL BIOPHYS PROFIL W/O NST: CPT | Mod: S$GLB,,, | Performed by: OBSTETRICS & GYNECOLOGY

## 2024-10-21 PROCEDURE — 82962 GLUCOSE BLOOD TEST: CPT | Mod: ,,, | Performed by: OBSTETRICS & GYNECOLOGY

## 2024-10-21 PROCEDURE — 1159F MED LIST DOCD IN RCRD: CPT | Mod: CPTII,S$GLB,, | Performed by: OBSTETRICS & GYNECOLOGY

## 2024-10-21 PROCEDURE — 1160F RVW MEDS BY RX/DR IN RCRD: CPT | Mod: CPTII,S$GLB,, | Performed by: OBSTETRICS & GYNECOLOGY

## 2024-10-21 PROCEDURE — 76816 OB US FOLLOW-UP PER FETUS: CPT | Mod: S$GLB,,, | Performed by: OBSTETRICS & GYNECOLOGY

## 2024-10-21 RX ORDER — METFORMIN HYDROCHLORIDE 500 MG/1
TABLET ORAL
Qty: 90 TABLET | Refills: 0 | Status: SHIPPED | OUTPATIENT
Start: 2024-10-21
